# Patient Record
Sex: FEMALE | Race: WHITE | NOT HISPANIC OR LATINO | URBAN - METROPOLITAN AREA
[De-identification: names, ages, dates, MRNs, and addresses within clinical notes are randomized per-mention and may not be internally consistent; named-entity substitution may affect disease eponyms.]

---

## 2023-09-15 ENCOUNTER — WEB ENCOUNTER (OUTPATIENT)
Dept: URBAN - METROPOLITAN AREA CLINIC 74 | Facility: CLINIC | Age: 38
End: 2023-09-15

## 2023-09-15 ENCOUNTER — LAB OUTSIDE AN ENCOUNTER (OUTPATIENT)
Dept: URBAN - METROPOLITAN AREA CLINIC 74 | Facility: CLINIC | Age: 38
End: 2023-09-15

## 2023-09-15 ENCOUNTER — DASHBOARD ENCOUNTERS (OUTPATIENT)
Age: 38
End: 2023-09-15

## 2023-09-15 ENCOUNTER — OFFICE VISIT (OUTPATIENT)
Dept: URBAN - METROPOLITAN AREA CLINIC 74 | Facility: CLINIC | Age: 38
End: 2023-09-15
Payer: COMMERCIAL

## 2023-09-15 VITALS
DIASTOLIC BLOOD PRESSURE: 64 MMHG | OXYGEN SATURATION: 100 % | HEIGHT: 65 IN | SYSTOLIC BLOOD PRESSURE: 102 MMHG | BODY MASS INDEX: 32.59 KG/M2 | TEMPERATURE: 97.2 F | HEART RATE: 95 BPM | WEIGHT: 195.6 LBS

## 2023-09-15 DIAGNOSIS — R10.9 ABDOMINAL CRAMPING: ICD-10-CM

## 2023-09-15 DIAGNOSIS — R10.31 RLQ ABDOMINAL PAIN: ICD-10-CM

## 2023-09-15 DIAGNOSIS — R10.84 DIFFUSE ABDOMINAL PAIN: ICD-10-CM

## 2023-09-15 DIAGNOSIS — R19.8 ALTERNATING CONSTIPATION AND DIARRHEA: ICD-10-CM

## 2023-09-15 DIAGNOSIS — R11.0 NAUSEA: ICD-10-CM

## 2023-09-15 PROCEDURE — 99204 OFFICE O/P NEW MOD 45 MIN: CPT | Performed by: PHYSICIAN ASSISTANT

## 2023-09-15 RX ORDER — POLYETHYLENE GLYCOL 3350, SODIUM SULFATE, SODIUM CHLORIDE, POTASSIUM CHLORIDE, ASCORBIC ACID, SODIUM ASCORBATE 140-9-5.2G
AS DIRECTED KIT ORAL
OUTPATIENT
Start: 2023-09-15

## 2023-09-15 RX ORDER — SOY PROTEIN
POWDER (GRAM) ORAL
Qty: 0 | Refills: 0 | Status: ON HOLD | COMMUNITY
Start: 1900-01-01

## 2023-09-15 NOTE — HPI-TODAY'S VISIT:
The patient is 38-year-old female with past medical history as noted below is presenting to our clinic today with acute on chronic abdominal pain. The patient complains of colicky abdominal pain on and off for years. She has alternating symptoms for years. She has also diarrhea and constipation for years. She has not any GI work up in the past. No EGD or Colonoscopy in the past. She has been watching her diets and eats very haelthy. No family history of colon polyps or colon cancer. She also has new diagnosis og POTS.  She is on ow salt diet. No other GI issues today.   -- The patient denies dyspepsia, dysphagia, odynophagia, hemoptysis, hematemesis, nausea, vomiting, regurgitation, melena, hematochezia, fever, chills, chest pain, SOB, or any other GI complaints today.  -- The patient denies ETOH, Tobacco, and Illicit drug use.  -- The patient is not up to date with Flu and COVID vaccine. -- Denies NSAID's.

## 2023-09-21 ENCOUNTER — LAB OUTSIDE AN ENCOUNTER (OUTPATIENT)
Dept: URBAN - METROPOLITAN AREA CLINIC 74 | Facility: CLINIC | Age: 38
End: 2023-09-21

## 2023-09-21 ENCOUNTER — TELEPHONE ENCOUNTER (OUTPATIENT)
Dept: URBAN - METROPOLITAN AREA CLINIC 40 | Facility: CLINIC | Age: 38
End: 2023-09-21

## 2023-09-22 ENCOUNTER — TELEPHONE ENCOUNTER (OUTPATIENT)
Dept: URBAN - METROPOLITAN AREA CLINIC 74 | Facility: CLINIC | Age: 38
End: 2023-09-22